# Patient Record
Sex: MALE | Race: WHITE
[De-identification: names, ages, dates, MRNs, and addresses within clinical notes are randomized per-mention and may not be internally consistent; named-entity substitution may affect disease eponyms.]

---

## 2022-01-05 ENCOUNTER — HOSPITAL ENCOUNTER (OUTPATIENT)
Dept: HOSPITAL 53 - M RAD | Age: 42
End: 2022-01-05
Attending: SPECIALIST
Payer: COMMERCIAL

## 2022-01-05 DIAGNOSIS — I80.201: Primary | ICD-10-CM

## 2022-01-05 NOTE — REP
INDICATION:

THROMBOSIS, STAN LEG PAIN



COMPARISON:

None.



TECHNIQUE:

Real time compression and duplex Doppler interrogation of the bilateral lower

extremity deep venous system is performed.  Compression ultrasound is performed of the

bilateral peroneal and posterior tibial veins.



FINDINGS:

Bilaterally, the common femoral, superficial femoral and popliteal veins are fully

compressible with transducer pressure and demonstrate normal spontaneous and phasic

flow, without evidence of deep venous thrombosis. The calf veins are not well

visualized bilaterally.



IMPRESSION:

No evidence of deep venous thrombosis of the bilateral lower extremity femoral

popliteal venous system.





<Electronically signed by Sherwin Ayala > 01/05/22 3010

## 2022-03-23 ENCOUNTER — HOSPITAL ENCOUNTER (OUTPATIENT)
Dept: HOSPITAL 53 - M SMT | Age: 42
End: 2022-03-23
Attending: UROLOGY
Payer: COMMERCIAL

## 2022-03-23 DIAGNOSIS — Z30.2: Primary | ICD-10-CM

## 2022-06-17 ENCOUNTER — HOSPITAL ENCOUNTER (OUTPATIENT)
Dept: HOSPITAL 53 - M SMT | Age: 42
End: 2022-06-17
Attending: UROLOGY
Payer: COMMERCIAL

## 2022-06-17 DIAGNOSIS — Z30.2: Primary | ICD-10-CM

## 2022-06-17 LAB
COLOR SMN: (no result)
SPECIMEN VOL SMN: 1.8 ML (ref 2–5)
VISC SMN QL: (no result)
WBC # SMN AUTO: (no result) 10*3/UL